# Patient Record
Sex: FEMALE | Race: WHITE | NOT HISPANIC OR LATINO | Employment: OTHER | URBAN - METROPOLITAN AREA
[De-identification: names, ages, dates, MRNs, and addresses within clinical notes are randomized per-mention and may not be internally consistent; named-entity substitution may affect disease eponyms.]

---

## 2017-01-18 ENCOUNTER — OFFICE VISIT (OUTPATIENT)
Dept: FAMILY MEDICINE | Facility: OTHER | Age: 32
End: 2017-01-18
Payer: COMMERCIAL

## 2017-01-18 VITALS
OXYGEN SATURATION: 98 % | BODY MASS INDEX: 25.34 KG/M2 | HEIGHT: 63 IN | HEART RATE: 84 BPM | TEMPERATURE: 97.8 F | DIASTOLIC BLOOD PRESSURE: 60 MMHG | WEIGHT: 143 LBS | SYSTOLIC BLOOD PRESSURE: 108 MMHG | RESPIRATION RATE: 12 BRPM

## 2017-01-18 DIAGNOSIS — J06.9 VIRAL UPPER RESPIRATORY TRACT INFECTION: Primary | ICD-10-CM

## 2017-01-18 DIAGNOSIS — R68.89 FLU-LIKE SYMPTOMS: ICD-10-CM

## 2017-01-18 LAB
DEPRECATED S PYO AG THROAT QL EIA: NORMAL
FLUAV+FLUBV AG SPEC QL: NEGATIVE
FLUAV+FLUBV AG SPEC QL: NORMAL
MICRO REPORT STATUS: NORMAL
SPECIMEN SOURCE: NORMAL
SPECIMEN SOURCE: NORMAL

## 2017-01-18 PROCEDURE — 87081 CULTURE SCREEN ONLY: CPT | Performed by: PHYSICIAN ASSISTANT

## 2017-01-18 PROCEDURE — 87804 INFLUENZA ASSAY W/OPTIC: CPT | Performed by: PHYSICIAN ASSISTANT

## 2017-01-18 PROCEDURE — 87880 STREP A ASSAY W/OPTIC: CPT | Performed by: PHYSICIAN ASSISTANT

## 2017-01-18 PROCEDURE — 99213 OFFICE O/P EST LOW 20 MIN: CPT | Performed by: PHYSICIAN ASSISTANT

## 2017-01-18 RX ORDER — BENZONATATE 100 MG/1
100 CAPSULE ORAL 3 TIMES DAILY PRN
Qty: 30 CAPSULE | Refills: 0 | Status: SHIPPED | OUTPATIENT
Start: 2017-01-18 | End: 2017-03-31

## 2017-01-18 ASSESSMENT — PAIN SCALES - GENERAL: PAINLEVEL: MODERATE PAIN (5)

## 2017-01-18 NOTE — PATIENT INSTRUCTIONS
Negative strep and influenza testing.  This is likely a viral illness.  Will prescribe Tessalon pearls to help with the cough.  Continue with Sudafed and Mucinex as needed.  I recommend you try Flonase to help with then nasal congestion/swelling.  Drink plenty of fluids.  Use a humidifier nightly.  Honey mixed with warm tea can help with cough and sore throat.   Follow up if symptoms are not improving.

## 2017-01-18 NOTE — PROGRESS NOTES
"  SUBJECTIVE:                                                    Yue Ruano is a 31 year old female who presents to clinic today for the following health issues:    HPI    Acute Illness   Acute illness concerns: flu?  Onset: 4 days     Fever: YES    Chills/Sweats: YES    Headache (location?): YES- frontal    Sinus Pressure:YES- post-nasal drainage    Conjunctivitis:  no    Ear Pain: YES: both    Rhinorrhea: no    Congestion: YES    Sore Throat: YES- might be due to coughing?     Cough: YES-productive of green sputum    Wheeze: no    Decreased Appetite: YES    Nausea: YES    Vomiting: no    Diarrhea:  no    Dysuria/Freq.: no    Fatigue/Achiness: YES    Sick/Strep Exposure: YES-  had flu/children sick     Therapies Tried and outcome: mucinex, tylenol cold, sudafed PE, radha seltzer plus    Symptoms since early Saturday morning. Sinus congestion, productive cough and ear pain.     Problem list and histories reviewed & adjusted, as indicated.  Additional history: as documented    Problem list, Medication list, Allergies, and Medical/Social/Surgical histories reviewed in EPIC and updated as appropriate.    ROS:  GENERAL: Denies fever, fatigue, weakness, weight gain, or weight loss.  HEENT: Eyes-Denies pain, redness, loss of vision, double or blurred vision.     Ears/Nose- +Congestion, sinus pressure, frontal headache, sore throat. Denies tinnitus, loss of hearing, epistaxis, decreased sense of smell. Denies loss of sense of taste, dry mouth.  CARDIOVASCULAR: Denies chest pain, shortness of breath, irregular heartbeats,  palpitations, or edema.  RESPIRATORY: +Productive cough. Denies hemoptysis and shortness of breath.     OBJECTIVE:                                                    /60 mmHg  Pulse 84  Temp(Src) 97.8  F (36.6  C) (Temporal)  Resp 12  Ht 5' 3.39\" (1.61 m)  Wt 143 lb (64.864 kg)  BMI 25.02 kg/m2  SpO2 98%  Body mass index is 25.02 kg/(m^2).  GENERAL: healthy, alert and no " distress  EYES: Eyes grossly normal to inspection, PERRL and conjunctivae and sclerae normal  HENT: ear canals and TM's normal. Nasal mucosa is erythematous. Pharynx is clear.   NECK: no adenopathy, no asymmetry, masses, or scars and thyroid normal to palpation  RESP: lungs clear to auscultation - no rales, rhonchi or wheezes  CV: regular rate and rhythm, normal S1 S2, no S3 or S4, no murmur, click or rub    Diagnostic Test Results:  Strep screen - Negative  Influenza A/B: Negative     ASSESSMENT/PLAN:                                                        ICD-10-CM    1. Viral upper respiratory tract infection J06.9 Strep, Rapid Screen    B97.89 Beta strep group A culture     benzonatate (TESSALON) 100 MG capsule   2. Flu-like symptoms R68.89 Influenza A/B antigen       Negative strep and influenza testing.  Discussed likely viral etiology.   Will prescribe Tessalon pearls to help with the cough.  Continue with Sudafed and Mucinex as needed.  I recommend she try Flonase to help with then nasal congestion/swelling.  Drink plenty of fluids.  Use a humidifier nightly.  Honey mixed with warm tea can help with cough and sore throat.   Follow up if symptoms are not improving.     Brijesh Adam PA-C  [facility]

## 2017-01-18 NOTE — MR AVS SNAPSHOT
After Visit Summary   1/18/2017    Yue Ruano    MRN: [id number]           Patient Information     Date Of Birth          1985        Visit Information        Provider Department      1/18/2017 1:30 PM Brijesh Adam PA-C [facility]        Today's Diagnoses     Viral upper respiratory tract infection    -  1     Flu-like symptoms           Care Instructions    Negative strep and influenza testing.  This is likely a viral illness.  Will prescribe Tessalon pearls to help with the cough.  Continue with Sudafed and Mucinex as needed.  I recommend you try Flonase to help with then nasal congestion/swelling.  Drink plenty of fluids.  Use a humidifier nightly.  Honey mixed with warm tea can help with cough and sore throat.   Follow up if symptoms are not improving.         Follow-ups after your visit        Who to contact     If you have questions or need follow up information about today's clinic visit or your schedule please contact [facility] directly at [phone].  Normal or non-critical lab and imaging results will be communicated to you by [employer], letter or phone within 4 business days after the clinic has received the results. If you do not hear from us within 7 days, please contact the clinic through [employer] or phone. If you have a critical or abnormal lab result, we will notify you by phone as soon as possible.  Submit refill requests through [employer] or call your pharmacy and they will forward the refill request to us. Please allow 3 business days for your refill to be completed.          Additional Information About Your Visit        [employer] Information     [employer] gives you secure access to your electronic health record. If you see a primary care provider, you can also send messages to your care team and make appointments. If you have questions, please call your primary care clinic.  If you do not have a primary care provider, please call  "[phone] and they will assist you.        Care EveryWhere ID     This is your Care EveryWhere ID. This could be used by other organizations to access your [facility] medical records  [id number]        Your Vitals Were     Pulse Temperature Respirations Height BMI (Body Mass Index) Pulse Oximetry    84 97.8  F (36.6  C) (Temporal) 12 5' 3.39\" (1.61 m) 25.02 kg/m2 98%       Blood Pressure from Last 3 Encounters:   01/18/17 108/60   09/16/16 114/70   04/01/16 98/60    Weight from Last 3 Encounters:   01/18/17 143 lb (64.864 kg)   09/16/16 142 lb (64.411 kg)   04/01/16 145 lb (65.772 kg)              We Performed the Following     Beta strep group A culture     Influenza A/B antigen     Strep, Rapid Screen          Today's Medication Changes          These changes are accurate as of: 1/18/17  2:07 PM.  If you have any questions, ask your nurse or doctor.               Start taking these medicines.        Dose/Directions    benzonatate 100 MG capsule   Commonly known as:  TESSALON   Used for:  Viral upper respiratory tract infection   Started by:  Brijesh Adam PA-C        Dose:  100 mg   Take 1 capsule (100 mg) by mouth 3 times daily as needed   Quantity:  30 capsule   Refills:  0            Where to get your medicines      These medications were sent to [facility] [address] [address]     Phone:  [phone]    - benzonatate 100 MG capsule             Primary Care Provider Office Phone # Fax #    Laurie Curtis [phone]       [facility]  [address] [facility] [address]        Thank you!     Thank you for choosing [facility]  for your care. Our goal is always to provide you with excellent care. Hearing back from our patients is one way we can continue to improve our services. Please take a few minutes to complete the written survey that you may receive in the mail after your visit with " us. Thank you!             Your Updated Medication List - Protect others around you: Learn how to safely use, store and throw away your medicines at www.disposemymeds.org.          This list is accurate as of: 1/18/17  2:07 PM.  Always use your most recent med list.                   Brand Name Dispense Instructions for use    benzonatate 100 MG capsule    TESSALON    30 capsule    Take 1 capsule (100 mg) by mouth 3 times daily as needed       loratadine 10 MG tablet    CLARITIN     Take 10 mg by mouth daily       norethindrone 0.35 MG per tablet    MICRONOR    84 tablet    Take 1 tablet (0.35 mg) by mouth daily       polyethylene glycol powder    MIRALAX    510 g    Take 17 g by mouth daily       PRENATAL MULTIvitamin  -ULTRA per tablet     100 tablet    Take 1 tablet by mouth daily       sertraline 25 MG tablet    ZOLOFT    30 tablet    Take 1 tablet (25 mg) by mouth daily

## 2017-01-18 NOTE — NURSING NOTE
"Chief Complaint   Patient presents with     Flu       Initial /60 mmHg  Pulse 84  Temp(Src) 97.8  F (36.6  C) (Temporal)  Resp 12  Ht 5' 3.39\" (1.61 m)  Wt 143 lb (64.864 kg)  BMI 25.02 kg/m2  SpO2 98% Estimated body mass index is 25.02 kg/(m^2) as calculated from the following:    Height as of this encounter: 5' 3.39\" (1.61 m).    Weight as of this encounter: 143 lb (64.864 kg).  BP completed using cuff size: regular    Charmaine Hugo CMA      "

## 2017-01-20 LAB
BACTERIA SPEC CULT: NORMAL
MICRO REPORT STATUS: NORMAL
SPECIMEN SOURCE: NORMAL

## 2017-03-15 ENCOUNTER — TELEPHONE (OUTPATIENT)
Dept: NURSING | Facility: CLINIC | Age: 32
End: 2017-03-15

## 2017-03-16 NOTE — TELEPHONE ENCOUNTER
"Call Type: Triage Call    Presenting Problem: \"I have very itchy hands, feet, and an itchy  head.\" Symptom began a few months ago, but the itchiness continues.  Pt. has followed her 's recommendations, to relieve the itch, but  pt. says that nothing is helping. The itchy skin only happens at  night, too.  Triage Note:  Guideline Title: Allergies ; Allergies  Recommended Disposition: Call Provider within 8 Hours  Original Inclination: Wanted to speak with a nurse  Override Disposition: See Physician within 4 Hours  Intended Action: Patient does not know  Physician Contacted: No  First occurrence of mild allergic reaction (rash; hives; itching; nasal  congestion; watery, red eyes) that occurs within minutes to several hours after  exposure to an allergen AND without any other signs or symptoms of anaphylaxis. ?  YES  Signs/symptoms of anaphylaxis ? NO  Severe breathing problems not related to nasal congestion ? NO  Breathing problems not related to nasal congestion ? NO  Diagnosed with asthma and having symptoms ? NO  Rash without any signs of anaphylaxis ? NO  Hives without any signs of anaphylaxis ? NO  Skin lesions without any signs of anaphylaxis ? NO  Physician Instructions:  Care Advice: Apply cool compresses for 15 to 20 minutes 4 to 6 times a day  to relieve discomfort.  Wear loose-fitting, non-restricting clothing.  Cool/tepid showers or baths may help relieve itching.  If cool water alone  does not relieve itching, try adding 1/2 to 1 cup baking soda or colloidal  oatmeal (Aveeno) to bath water.  Should not be alone for the next 24 hours in case symptoms worsen.  IMMEDIATE ACTION  CAUTIONS  SYMPTOM / CONDITION MANAGEMENT  Call  if develop signs and symptoms of anaphylaxis within minutes to  several hours of exposure: severe difficulty breathing  rapid, weak or irregular pulse  pruritus, urticaria, swelling of face, lips, tongue, or throat causing  tightness or difficulty swallowing  abdominal " cramping, nausea, vomiting or diarrhea.  If immediately available, consider taking an appropriate dose of a  nonprescription antihistamine (e.g., Benadryl) orally as directed by the  label or a pharmacist. These medications may cause drowsiness and should be  taken with caution by adults 75 years or older.

## 2017-03-31 ENCOUNTER — OFFICE VISIT (OUTPATIENT)
Dept: FAMILY MEDICINE | Facility: CLINIC | Age: 32
End: 2017-03-31
Payer: COMMERCIAL

## 2017-03-31 VITALS
BODY MASS INDEX: 24.67 KG/M2 | DIASTOLIC BLOOD PRESSURE: 65 MMHG | SYSTOLIC BLOOD PRESSURE: 130 MMHG | TEMPERATURE: 98.5 F | WEIGHT: 141 LBS | HEART RATE: 95 BPM

## 2017-03-31 DIAGNOSIS — J06.9 VIRAL URI WITH COUGH: Primary | ICD-10-CM

## 2017-03-31 PROCEDURE — 99213 OFFICE O/P EST LOW 20 MIN: CPT | Performed by: FAMILY MEDICINE

## 2017-03-31 RX ORDER — CODEINE PHOSPHATE AND GUAIFENESIN 10; 100 MG/5ML; MG/5ML
1-2 SOLUTION ORAL EVERY 4 HOURS PRN
Qty: 240 ML | Refills: 0 | Status: SHIPPED | OUTPATIENT
Start: 2017-03-31 | End: 2017-05-05

## 2017-03-31 NOTE — NURSING NOTE
"Chief Complaint   Patient presents with     Ear Problem     vomit, hedache, st, head congestion, cough, x 6 days       Initial /65 (BP Location: Right arm, Cuff Size: Adult Regular)  Pulse 95  Temp 98.5  F (36.9  C) (Oral)  Wt 141 lb (64 kg)  BMI 24.67 kg/m2 Estimated body mass index is 24.67 kg/(m^2) as calculated from the following:    Height as of 1/18/17: 5' 3.39\" (1.61 m).    Weight as of this encounter: 141 lb (64 kg).  Medication Reconciliation: complete   Gosia Dougherty M.A.      "

## 2017-03-31 NOTE — MR AVS SNAPSHOT
After Visit Summary   3/31/2017    Yue Ruano    MRN: [id number]           Patient Information     Date Of Birth          1985        Visit Information        Provider Department      3/31/2017 11:30 AM Jeremiah Nash MD [facility]        Today's Diagnoses     Viral URI with cough    -  1       Follow-ups after your visit        Who to contact     If you have questions or need follow up information about today's clinic visit or your schedule please contact [facility] directly at [phone].  Normal or non-critical lab and imaging results will be communicated to you by MyChart, letter or phone within 4 business days after the clinic has received the results. If you do not hear from us within 7 days, please contact the clinic through [employer] or phone. If you have a critical or abnormal lab result, we will notify you by phone as soon as possible.  Submit refill requests through [employer] or call your pharmacy and they will forward the refill request to us. Please allow 3 business days for your refill to be completed.          Additional Information About Your Visit        MyChart Information     [employer] gives you secure access to your electronic health record. If you see a primary care provider, you can also send messages to your care team and make appointments. If you have questions, please call your primary care clinic.  If you do not have a primary care provider, please call [phone] and they will assist you.        Care EveryWhere ID     This is your Care EveryWhere ID. This could be used by other organizations to access your [facility] medical records  [id number]        Your Vitals Were     Pulse Temperature BMI (Body Mass Index)             95 98.5  F (36.9  C) (Oral) 24.67 kg/m2          Blood Pressure from Last 3 Encounters:   03/31/17 130/65   01/18/17 108/60   09/16/16 114/70    Weight from Last 3 Encounters:   03/31/17 141 lb (64 kg)   01/18/17 143  lb (64.9 kg)   09/16/16 142 lb (64.4 kg)              Today, you had the following     No orders found for display         Today's Medication Changes          These changes are accurate as of: 3/31/17  3:43 PM.  If you have any questions, ask your nurse or doctor.               Start taking these medicines.        Dose/Directions    guaiFENesin-codeine 100-10 MG/5ML Soln solution   Commonly known as:  ROBITUSSIN AC   Used for:  Viral URI with cough   Started by:  Jeremiah Nash MD        Dose:  1-2 tsp.   Take 5-10 mLs by mouth every 4 hours as needed for cough   Quantity:  240 mL   Refills:  0            Where to get your medicines      Some of these will need a paper prescription and others can be bought over the counter.  Ask your nurse if you have questions.     Bring a paper prescription for each of these medications     guaiFENesin-codeine 100-10 MG/5ML Soln solution                Primary Care Provider Office Phone # Fax #    Laurie Na Curtis [phone]       [address] [address]        Thank you!     Thank you for choosing [facility]  for your care. Our goal is always to provide you with excellent care. Hearing back from our patients is one way we can continue to improve our services. Please take a few minutes to complete the written survey that you may receive in the mail after your visit with us. Thank you!             Your Updated Medication List - Protect others around you: Learn how to safely use, store and throw away your medicines at www.disposemymeds.org.          This list is accurate as of: 3/31/17  3:43 PM.  Always use your most recent med list.                   Brand Name Dispense Instructions for use    guaiFENesin-codeine 100-10 MG/5ML Soln solution    ROBITUSSIN AC    240 mL    Take 5-10 mLs by mouth every 4 hours as needed for cough

## 2017-03-31 NOTE — PROGRESS NOTES
"SUBJECTIVE:   Yue Ruano is a 31 year old female presenting with a chief complaint of a cough.  The patient first noted the onset of symptoms was 5 day(s) ago.  The patient (or parent) reports that she first had symptoms of fatigue . After that she started having symptoms of bilateral ear pain and a sore throat. These symptom(s) got worse the next day. After that she started having symptoms of a cough and some muscle aches. Other symptoms that followed include feeling warm but she did not check her temperature. She vomited once on Tuesday.      She (or parent) denies: shortness of breath.    The patient has the following predisposing factors for infection:None.    Patient Active Problem List   Diagnosis     CARDIOVASCULAR SCREENING; LDL GOAL LESS THAN 160     High-risk pregnancy     Previous  delivery, antepartum condition or complication     S/P  section     Adjustment disorder with mixed anxiety and depressed mood     No current outpatient prescriptions on file.     Social History   Substance Use Topics     Smoking status: Former Smoker     Years: 1.00     Types: Cigarettes     Smokeless tobacco: Never Used      Comment: Was a \"social\" smoker  NONE SINCE PREGNANT     Alcohol use No       OBJECTIVE  :/65 (BP Location: Right arm, Cuff Size: Adult Regular)  Pulse 95  Temp 98.5  F (36.9  C) (Oral)  Wt 141 lb (64 kg)  BMI 24.67 kg/m2  GENERAL APPEARANCE: healthy, alert and no distress  EYES: EOMI,  PERRL, conjunctiva clear  HENT: ear canals and TM's normal.  Nose and mouth without ulcers, erythema or lesions  NECK: supple, nontender, no lymphadenopathy  RESP: lungs clear to auscultation - no rales, rhonchi or wheezes  CV: regular rates and rhythm, normal S1 S2, no murmur noted  ABDOMEN:  soft, nontender, no HSM or masses and bowel sounds normal  NEURO: Normal strength and tone, sensory exam grossly normal,  normal speech and mentation  SKIN: no suspicious lesions or " mar    ASSESSMENT:  Viral upper respiratory infection    PLAN:  The patient was reassured that there is no evidence of a bacterial etiology., I recommended that the patient get lots of fluids and rest. and A prescription for Cherritussin AC was given    During the visit I did wear a mask the entire time I was in the exam room with the patient.    During the visit the patient did wear a mask while I was in the exam room with her  except when I was examining her nose and throat or doing the nasopharyngeal swab.

## 2017-05-04 NOTE — PROGRESS NOTES
"  SUBJECTIVE:                                                    Yue Ruano is a 31 year old female who presents to clinic today for the following health issues:      HPI    Concern - Skin bump      Onset: 2 months     Description:   Red bump, right upper arm     Intensity: moderate    Progression of Symptoms:  same    Accompanying Signs & Symptoms:  -bleeds daily  -isn't getting smaller  -size of an eraser        Previous history of similar problem:   None     Precipitating factors:   Worsened by: none    Alleviating factors:  Improved by: bandaids keep it from bleeding        Therapies Tried and outcome: clear ointment, popping it.   She states that area showed up a couple months ago she is concerned that it is bleeding a lot.    PHQ-9 SCORE 12/28/2015 9/16/2016 5/5/2017   Total Score 2 12 4     EJ-7 SCORE 12/28/2015 9/16/2016 5/5/2017   Total Score 1 9 11     Anxiety level is elevated. She feels this is due to worry over lesion on arm.     Problem list and histories reviewed & adjusted, as indicated.  Additional history: as documented      ROS:  Constitutional, HEENT, cardiovascular, pulmonary, gi and gu systems are negative, except as otherwise noted.    OBJECTIVE:                                                    /70 (BP Location: Left arm, Patient Position: Chair, Cuff Size: Adult Regular)  Pulse 62  Temp 98  F (36.7  C) (Oral)  Resp 16  Ht 5' 3.39\" (1.61 m)  Wt 139 lb (63 kg)  BMI 24.32 kg/m2  Body mass index is 24.32 kg/(m^2).  GENERAL: healthy, alert and no distress  NECK: no adenopathy, no asymmetry, masses, or scars and thyroid normal to palpation  SKIN: angiomata - right deltoid 1.5 cm diameter. Dry non oozing.    Diagnostic Test Results:  none      ASSESSMENT/PLAN:                                                      1. Cherry angioma    - DERMATOLOGY REFERRAL  Differential diagnosis could include pyogenic granuloma. She is concerned that if removed may leave scar. She would like " least invasive means to remove. Recommend seeing dermatology specialist as this may be treated with laser if applicable she will discuss further with specialty.       2. Adjustment disorder with mixed anxiety and depressed mood    Her anxiety level is elevated and she states that she feels this is mainly due to the lesion on her right arm. She states that she feels better now knowing that she can have removed. She will return to clinic if symptoms worsen or do not improve.    The patient indicates understanding of these issues and agrees with the plan.  Return to clinic prn    See Patient Instructions    KATIANA Whitt [facility]

## 2017-05-05 ENCOUNTER — OFFICE VISIT (OUTPATIENT)
Dept: FAMILY MEDICINE | Facility: OTHER | Age: 32
End: 2017-05-05
Payer: COMMERCIAL

## 2017-05-05 VITALS
TEMPERATURE: 98 F | RESPIRATION RATE: 16 BRPM | WEIGHT: 139 LBS | SYSTOLIC BLOOD PRESSURE: 110 MMHG | HEART RATE: 62 BPM | DIASTOLIC BLOOD PRESSURE: 70 MMHG | BODY MASS INDEX: 24.63 KG/M2 | HEIGHT: 63 IN

## 2017-05-05 DIAGNOSIS — F43.23 ADJUSTMENT DISORDER WITH MIXED ANXIETY AND DEPRESSED MOOD: ICD-10-CM

## 2017-05-05 DIAGNOSIS — D18.01 CHERRY ANGIOMA: Primary | ICD-10-CM

## 2017-05-05 PROCEDURE — 99214 OFFICE O/P EST MOD 30 MIN: CPT | Performed by: NURSE PRACTITIONER

## 2017-05-05 ASSESSMENT — ANXIETY QUESTIONNAIRES
1. FEELING NERVOUS, ANXIOUS, OR ON EDGE: NEARLY EVERY DAY
3. WORRYING TOO MUCH ABOUT DIFFERENT THINGS: MORE THAN HALF THE DAYS
7. FEELING AFRAID AS IF SOMETHING AWFUL MIGHT HAPPEN: NOT AT ALL
2. NOT BEING ABLE TO STOP OR CONTROL WORRYING: MORE THAN HALF THE DAYS
GAD7 TOTAL SCORE: 11
5. BEING SO RESTLESS THAT IT IS HARD TO SIT STILL: SEVERAL DAYS
IF YOU CHECKED OFF ANY PROBLEMS ON THIS QUESTIONNAIRE, HOW DIFFICULT HAVE THESE PROBLEMS MADE IT FOR YOU TO DO YOUR WORK, TAKE CARE OF THINGS AT HOME, OR GET ALONG WITH OTHER PEOPLE: SOMEWHAT DIFFICULT
6. BECOMING EASILY ANNOYED OR IRRITABLE: SEVERAL DAYS

## 2017-05-05 ASSESSMENT — PAIN SCALES - GENERAL: PAINLEVEL: NO PAIN (0)

## 2017-05-05 ASSESSMENT — PATIENT HEALTH QUESTIONNAIRE - PHQ9: 5. POOR APPETITE OR OVEREATING: MORE THAN HALF THE DAYS

## 2017-05-05 NOTE — MR AVS SNAPSHOT
After Visit Summary   5/5/2017    Yue Ruano    MRN: [id number]           Patient Information     Date Of Birth          1985        Visit Information        Provider Department      5/5/2017 9:20 AM Korin Chun APRN CNP [facility]        Today's Diagnoses     Hemangioma of skin    -  1      Care Instructions    Please follow up with dermatology clinic for hemangioma treatment as discussed.     Thank you  Korin Chun CNP          Follow-ups after your visit        Additional Services     DERMATOLOGY REFERRAL       [facility] [phone]   [email or web address]    Please be aware that coverage of these services is subject to the terms and limitations of your health insurance plan.  Call member services at your health plan with any benefit or coverage questions.      Please bring the following with you to your appointment:    (1) Any X-Rays, CTs or MRIs which have been performed.  Contact the facility where they were done to arrange for  prior to your scheduled appointment.    (2) List of current medications  (3) This referral request   (4) Any documents/labs given to you for this referral                  Who to contact     If you have questions or need follow up information about today's clinic visit or your schedule please contact [facility] directly at [phone].  Normal or non-critical lab and imaging results will be communicated to you by MyChart, letter or phone within 4 business days after the clinic has received the results. If you do not hear from us within 7 days, please contact the clinic through MyChart or phone. If you have a critical or abnormal lab result, we will notify you by phone as soon as possible.  Submit refill requests through [employer] or call your pharmacy and they will forward the refill  "request to us. Please allow 3 business days for your refill to be completed.          Additional Information About Your Visit        [employer] Information     [employer] gives you secure access to your electronic health record. If you see a primary care provider, you can also send messages to your care team and make appointments. If you have questions, please call your primary care clinic.  If you do not have a primary care provider, please call [phone] and they will assist you.        Care EveryWhere ID     This is your Care EveryWhere ID. This could be used by other organizations to access your [place] medical records  [id number]        Your Vitals Were     Pulse Temperature Respirations Height BMI (Body Mass Index)       62 98  F (36.7  C) (Oral) 16 5' 3.39\" (1.61 m) 24.32 kg/m2        Blood Pressure from Last 3 Encounters:   05/05/17 110/70   03/31/17 130/65   01/18/17 108/60    Weight from Last 3 Encounters:   05/05/17 139 lb (63 kg)   03/31/17 141 lb (64 kg)   01/18/17 143 lb (64.9 kg)              We Performed the Following     DERMATOLOGY REFERRAL        Primary Care Provider Office Phone # Fax #    Laurie Na Curtis [phone]       [address] [address]        Thank you!     Thank you for choosing [facility]  for your care. Our goal is always to provide you with excellent care. Hearing back from our patients is one way we can continue to improve our services. Please take a few minutes to complete the written survey that you may receive in the mail after your visit with us. Thank you!             Your Updated Medication List - Protect others around you: Learn how to safely use, store and throw away your medicines at www.disposemymeds.org.      Notice  As of 5/5/2017 10:01 AM    You have not been prescribed any medications.      "

## 2017-05-05 NOTE — NURSING NOTE
"Chief Complaint   Patient presents with     Lesion     shoulder     Panel Management     phq9/angelica       Initial /70 (BP Location: Left arm, Patient Position: Chair, Cuff Size: Adult Regular)  Pulse 62  Temp 98  F (36.7  C) (Oral)  Resp 16  Ht 5' 3.39\" (1.61 m)  Wt 139 lb (63 kg)  BMI 24.32 kg/m2 Estimated body mass index is 24.32 kg/(m^2) as calculated from the following:    Height as of this encounter: 5' 3.39\" (1.61 m).    Weight as of this encounter: 139 lb (63 kg).  Medication Reconciliation: complete    "

## 2017-05-05 NOTE — PATIENT INSTRUCTIONS
Please follow up with dermatology clinic for cherry angioma treatment as discussed.     Thank you  Korin Chun CNP

## 2017-05-06 ASSESSMENT — PATIENT HEALTH QUESTIONNAIRE - PHQ9: SUM OF ALL RESPONSES TO PHQ QUESTIONS 1-9: 4

## 2017-05-06 ASSESSMENT — ANXIETY QUESTIONNAIRES: GAD7 TOTAL SCORE: 11

## 2018-07-23 ENCOUNTER — HEALTH MAINTENANCE LETTER (OUTPATIENT)
Age: 33
End: 2018-07-23

## 2018-09-14 ENCOUNTER — VIRTUAL VISIT (OUTPATIENT)
Dept: FAMILY MEDICINE | Facility: CLINIC | Age: 33
End: 2018-09-14

## 2018-09-14 DIAGNOSIS — R05.9 COUGH: ICD-10-CM

## 2018-09-14 DIAGNOSIS — R09.81 CONGESTION OF PARANASAL SINUS: Primary | ICD-10-CM

## 2018-09-14 PROCEDURE — 99441 ZZC PHYSICIAN TELEPHONE EVALUATION 5-10 MIN: CPT | Performed by: NURSE PRACTITIONER

## 2018-09-14 RX ORDER — AMOXICILLIN 500 MG/1
1000 CAPSULE ORAL 2 TIMES DAILY
Qty: 40 CAPSULE | Refills: 0 | Status: SHIPPED | OUTPATIENT
Start: 2018-09-14 | End: 2018-09-24

## 2018-09-14 RX ORDER — FLUTICASONE PROPIONATE 50 MCG
1-2 SPRAY, SUSPENSION (ML) NASAL DAILY
Qty: 1 BOTTLE | Refills: 11 | Status: SHIPPED | OUTPATIENT
Start: 2018-09-14

## 2018-09-14 RX ORDER — BENZONATATE 200 MG/1
200 CAPSULE ORAL 3 TIMES DAILY PRN
Qty: 21 CAPSULE | Refills: 0 | Status: SHIPPED | OUTPATIENT
Start: 2018-09-14 | End: 2018-10-19

## 2018-09-14 NOTE — MR AVS SNAPSHOT
After Visit Summary   9/14/2018    Yue Ruano    MRN: [id number]           Patient Information     Date Of Birth          1985        Visit Information        Provider Department      9/14/2018 12:20 PM Madisyn Singleton APRN CNP [facility]        Today's Diagnoses     Congestion of paranasal sinus    -  1    Cough           Follow-ups after your visit        Who to contact     If you have questions or need follow up information about today's clinic visit or your schedule please contact [facility] directly at [phone].  Normal or non-critical lab and imaging results will be communicated to you by [employer], letter or phone within 4 business days after the clinic has received the results. If you do not hear from us within 7 days, please contact the clinic through [employer] or phone. If you have a critical or abnormal lab result, we will notify you by phone as soon as possible.  Submit refill requests through [employer] or call your pharmacy and they will forward the refill request to us. Please allow 3 business days for your refill to be completed.          Additional Information About Your Visit        MyChart Information     [employer] gives you secure access to your electronic health record. If you see a primary care provider, you can also send messages to your care team and make appointments. If you have questions, please call your primary care clinic.  If you do not have a primary care provider, please call [phone] and they will assist you.        Care EveryWhere ID     This is your Care EveryWhere ID. This could be used by other organizations to access your [place] medical records  [id number]         Blood Pressure from Last 3 Encounters:   05/05/17 110/70   03/31/17 130/65   01/18/17 108/60    Weight from Last 3 Encounters:   05/05/17 139 lb (63 kg)   03/31/17 141 lb (64 kg)   01/18/17 143 lb (64.9 kg)              Today, you had the following     No orders found  for display         Today's Medication Changes          These changes are accurate as of 9/14/18  4:50 PM.  If you have any questions, ask your nurse or doctor.               Start taking these medicines.        Dose/Directions    amoxicillin 500 MG capsule   Commonly known as:  AMOXIL   Used for:  Congestion of paranasal sinus   Started by:  Madisyn Singleton APRN CNP        Dose:  1000 mg   Take 2 capsules (1,000 mg) by mouth 2 times daily for 10 days   Quantity:  40 capsule   Refills:  0       benzonatate 200 MG capsule   Commonly known as:  TESSALON   Used for:  Cough   Started by:  Madisyn Singleton APRN CNP        Dose:  200 mg   Take 1 capsule (200 mg) by mouth 3 times daily as needed for cough   Quantity:  21 capsule   Refills:  0       fluticasone 50 MCG/ACT spray   Commonly known as:  FLONASE   Used for:  Congestion of paranasal sinus   Started by:  Madisyn Singleton APRN CNP        Dose:  1-2 spray   Spray 1-2 sprays into both nostrils daily   Quantity:  1 Bottle   Refills:  11            Where to get your medicines      These medications were sent to [facility] [address] [place], [place] [id number]     Phone:  [phone]     amoxicillin 500 MG capsule    benzonatate 200 MG capsule    fluticasone 50 MCG/ACT spray                Primary Care Provider Office Phone # Fax #    Laurie Curtis [phone]       [address] [place] [address]        Equal Access to Services     [facility]: Hadii anitra chun hadasho Soangely, waaxda luqadaha, qaybta kaalmada adeegyada, waxay juliane mccallum. So [facility] [phone].    ATENCIÓN: Si habla español, tiene a hillman disposición servicios gratuitos de asistencia lingüística. Manohar al [phone].    We comply with applicable federal civil rights laws and [place] laws. We do not discriminate on the basis of race, color, national origin, age, disability, sex, sexual orientation, or gender  identity.            Thank you!     Thank you for choosing [facility]  for your care. Our goal is always to provide you with excellent care. Hearing back from our patients is one way we can continue to improve our services. Please take a few minutes to complete the written survey that you may receive in the mail after your visit with us. Thank you!             Your Updated Medication List - Protect others around you: Learn how to safely use, store and throw away your medicines at www.disposemymeds.org.          This list is accurate as of 9/14/18  4:50 PM.  Always use your most recent med list.                   Brand Name Dispense Instructions for use Diagnosis    amoxicillin 500 MG capsule    AMOXIL    40 capsule    Take 2 capsules (1,000 mg) by mouth 2 times daily for 10 days    Congestion of paranasal sinus       benzonatate 200 MG capsule    TESSALON    21 capsule    Take 1 capsule (200 mg) by mouth 3 times daily as needed for cough    Cough       fluticasone 50 MCG/ACT spray    FLONASE    1 Bottle    Spray 1-2 sprays into both nostrils daily    Congestion of paranasal sinus

## 2018-09-14 NOTE — PROGRESS NOTES
"Yue Ruano is a 33 year old female who is being evaluated via a telephone visit.      The patient has been notified of following:     \"This telephone visit will be conducted via a call between you and your physician/provider. We have found that certain health care needs can be provided without the need for a physical exam.  This service lets us provide the care you need with a short phone conversation.  If a prescription is necessary we can send it directly to your pharmacy.  If lab work is needed we can place an order for that and you can then stop by our lab to have the test done at a later time.    We will bill your insurance company for this service.  Please check with your medical insurance if this type of visit is covered. You may be responsible for the cost of this type of visit if insurance coverage is denied.  The typical cost is $30 (10min), $59 (11-20min) and $85 (21-30min).  Most often these visits are shorter than 10 minutes.    If during the course of the call the physician/provider feels a telephone visit is not appropriate, you will not be charged for this service.\"       Consent has been obtained for this service by care team member: yes.   See the scanned image in the medical record.    Yue Ruano complains of  Sinus Problem      I have reviewed and updated the patient's Past Medical History, Social History, Family History and Medication List.    ALLERGIES  No known drug allergies    Sasha Koenig CMA (AAMA)   (MA signature)    Additional provider notes: 8 months allergic symptoms. OTC allergy treatments. Nasal congestion- using nasal spray- Zicam and generic nasal target brand- Afrin?  Now cold symptoms. Ears ringing. Mild cough.   Fever- maybe slight earlier- not now.   Sudafed yest was not helpful. Tylenol cold and flu- not helpful.   Sore throat- mild  Not productive cough. Breathing well, PO is normal.       Assessment/Plan:     Congestion of paranasal sinus  Cough       Stop other " nasal sprays. Home cares, If recurrent or continues to be chronic- discussed ENT referral.   Warning signs discussed.   I have reviewed the note as documented above.  This accurately captures the substance of my conversation with the patient,        Total time of call between patient and provider was 6 minutes

## 2018-10-18 NOTE — PROGRESS NOTES
SUBJECTIVE:   Yue Ruano is a 33 year old female who presents to clinic today for the following health issues:      HPI  Acute Illness   Acute illness concerns: sinus/cold  Onset: cough 1 week, sinus 6 months    Fever: no     Chills/Sweats: no     Headache (location?): YES    Sinus Pressure:YES    Conjunctivitis:  no    Ear Pain: no    Rhinorrhea: YES    Congestion: YES    Sore Throat: YES     Cough: YES-non-productive    Wheeze: no     Decreased Appetite: no     Nausea: no     Vomiting: no     Diarrhea:  no     Dysuria/Freq.: no     Fatigue/Achiness: .Yes     Sick/Strep Exposure: YES     Therapies Tried and outcome: Nasal Spray, Tylenol Cold    She is constantly congested and has had trouble breathing through her nose for the past 6 months. She was treated for a sinus infection with Augmentin 6 months ago but this did not help. She has been using Zicam nasal spray twice daily for the past 5-6 months routinely which seems to be the only thing that helps with her congestion. She denies any recent allergy symptoms such as itchy, watery eyes or a scratchy throat. When she blows her nose, the mucous is usually clear. She has tried an off brand antihistamine on and off without benefit. She tried Flonase nasal spray last month for 2 weeks and feels like this worsened her congestion. She denies any fevers or chills. She states her sense of smell has been decreased recently.       Problem list and histories reviewed & adjusted, as indicated.  Additional history: none    ROS:  GENERAL: Denies fever, fatigue, weakness, weight gain, or weight loss.  HEENT: Eyes-Denies pain, redness, loss of vision, double or blurred vision.     Ears/Nose- +Sinus congestion/pressure. Denies tinnitus, loss of hearing, epistaxis, decreased sense of smell. Denies loss of sense of taste, dry mouth, or sore throat.   CARDIOVASCULAR: Denies chest pain, shortness of breath, irregular heartbeats, palpitations, or edema.  RESPIRATORY: +Dry cough.  Denies hemoptysis and shortness of breath.  NEUROLOGIC: +Intermittent frontal headaches. Denies fainting, dizziness, memory loss, numbness, tingling, or seizures.    OBJECTIVE:     /60  Pulse 101  Temp 98.1  F (36.7  C) (Temporal)  Wt 143 lb (64.9 kg)  SpO2 96%  Breastfeeding? No  BMI 25.02 kg/m2  Body mass index is 25.02 kg/(m^2).  GENERAL: healthy, alert and no distress  EYES: Eyes grossly normal to inspection, PERRL and conjunctivae and sclerae normal  HENT: ear canals and TM's normal. Nasal mucosa is mildly erythematous and edematous with clear drainage. No sinus tenderness.   NECK: no adenopathy, no asymmetry, masses, or scars and thyroid normal to palpation  RESP: lungs clear to auscultation - no rales, rhonchi or wheezes  CV: regular rate and rhythm, normal S1 S2, no S3 or S4, no murmur, click or rub    ASSESSMENT/PLAN:       ICD-10-CM    1. Chronic congestion of paranasal sinus J32.9        I recommend she stop the Zicam as this can cause worsening congestion over time and decreased sense of smell.   I recommend a daily antihistamine like Claritin or Zyrtec for the next month as her symptoms are likely allergy related versus chronic sinusitis. No evidence of acute sinusitis so antibiotics are not warranted.   I also recommend trying a saline nasal spray along with Flonase right using the saline to see if it is more effective..  I recommend she try Sudafed for 3-5 days.  Can also try a Nettipot again.  If not improving over the next month, will refer to ENT.      Brijesh Adam PA-C  [facility]

## 2018-10-19 ENCOUNTER — OFFICE VISIT (OUTPATIENT)
Dept: FAMILY MEDICINE | Facility: OTHER | Age: 33
End: 2018-10-19
Payer: MEDICAID

## 2018-10-19 VITALS
DIASTOLIC BLOOD PRESSURE: 60 MMHG | WEIGHT: 143 LBS | OXYGEN SATURATION: 96 % | TEMPERATURE: 98.1 F | SYSTOLIC BLOOD PRESSURE: 100 MMHG | BODY MASS INDEX: 25.02 KG/M2 | HEART RATE: 101 BPM

## 2018-10-19 DIAGNOSIS — J32.9 CHRONIC CONGESTION OF PARANASAL SINUS: Primary | ICD-10-CM

## 2018-10-19 PROCEDURE — 99213 OFFICE O/P EST LOW 20 MIN: CPT | Performed by: PHYSICIAN ASSISTANT

## 2018-10-19 NOTE — MR AVS SNAPSHOT
After Visit Summary   10/19/2018    Yue Ruano    MRN: [id number]           Patient Information     Date Of Birth          1985        Visit Information        Provider Department      10/19/2018 2:00 PM Brijesh Adam PA-C [facility]        Today's Diagnoses     Chronic congestion of paranasal sinus    -  1      Care Instructions    Stop the Zicam.  I recommend a daily antihistamine like Claritin or Zyrtec for the next month.  I also recommend trying a saline nasal spray along with Flonase right after.  Try Sudafed for 3-5 days.  You can also try a Nettipot again.  If not improving over the next month, will send you to ENT.              Follow-ups after your visit        Who to contact     If you have questions or need follow up information about today's clinic visit or your schedule please contact [facility] directly at [phone].  Normal or non-critical lab and imaging results will be communicated to you by [employer], letter or phone within 4 business days after the clinic has received the results. If you do not hear from us within 7 days, please contact the clinic through [employer] or phone. If you have a critical or abnormal lab result, we will notify you by phone as soon as possible.  Submit refill requests through [employer] or call your pharmacy and they will forward the refill request to us. Please allow 3 business days for your refill to be completed.          Additional Information About Your Visit        MyChart Information     [employer] gives you secure access to your electronic health record. If you see a primary care provider, you can also send messages to your care team and make appointments. If you have questions, please call your primary care clinic.  If you do not have a primary care provider, please call [phone] and they will assist you.        Care EveryWhere ID     This is your Care EveryWhere ID. This could be used by other  organizations to access your [place] medical records  [id number]        Your Vitals Were     Pulse Temperature Pulse Oximetry Breastfeeding? BMI (Body Mass Index)       101 98.1  F (36.7  C) (Temporal) 96% No 25.02 kg/m2        Blood Pressure from Last 3 Encounters:   10/19/18 100/60   05/05/17 110/70   03/31/17 130/65    Weight from Last 3 Encounters:   10/19/18 143 lb (64.9 kg)   05/05/17 139 lb (63 kg)   03/31/17 141 lb (64 kg)              Today, you had the following     No orders found for display         Today's Medication Changes          These changes are accurate as of 10/19/18  2:32 PM.  If you have any questions, ask your nurse or doctor.               Stop taking these medicines if you haven't already. Please contact your care team if you have questions.     benzonatate 200 MG capsule   Commonly known as:  TESSALON   Stopped by:  Brijesh Adam PA-C                    Primary Care Provider Office Phone # Fax #    Laurie Na Curtis [phone]       [address] [place] [address]        Equal Access to Services     [facility]: Hadii anitra chun hadasho Soangely, waaxda luqadaha, qaybta kaalmada rebecca, mario alberto frias . So [facility] [phone].    ATENCIÓN: Si habla español, tiene a hillman disposición servicios gratuitos de asistencia lingüística. [facility] [phone].    We comply with applicable federal civil rights laws and [place] laws. We do not discriminate on the basis of race, color, national origin, age, disability, sex, sexual orientation, or gender identity.            Thank you!     Thank you for choosing [facility]  for your care. Our goal is always to provide you with excellent care. Hearing back from our patients is one way we can continue to improve our services. Please take a few minutes to complete the written survey that you may receive in the mail after your visit with us. Thank you!             Your Updated  Medication List - Protect others around you: Learn how to safely use, store and throw away your medicines at www.disposemymeds.org.          This list is accurate as of 10/19/18  2:32 PM.  Always use your most recent med list.                   Brand Name Dispense Instructions for use Diagnosis    fluticasone 50 MCG/ACT spray    FLONASE    1 Bottle    Spray 1-2 sprays into both nostrils daily    Congestion of paranasal sinus

## 2018-10-19 NOTE — PATIENT INSTRUCTIONS
Stop the Zicam.  I recommend a daily antihistamine like Claritin or Zyrtec for the next month.  I also recommend trying a saline nasal spray along with Flonase right after.  Try Sudafed for 3-5 days.  You can also try a Nettipot again.  If not improving over the next month, will send you to ENT.

## 2018-11-29 ENCOUNTER — TELEPHONE (OUTPATIENT)
Dept: FAMILY MEDICINE | Facility: OTHER | Age: 33
End: 2018-11-29

## 2018-11-29 NOTE — TELEPHONE ENCOUNTER
Summary:    Patient is due/failing the following:   PAP and PHYSICAL    Action needed:   Patient needs office visit for PAP/PE.    Type of outreach:    Phone, left message for patient to call back.     Questions for provider review:    None                                                                                                                                    Tammy Espinal       Chart routed to Care Team .        Panel Management Review      Patient has the following on her problem list: None      Composite cancer screening  Chart review shows that this patient is due/due soon for the following Pap Smear

## 2019-04-10 ENCOUNTER — TELEPHONE (OUTPATIENT)
Dept: FAMILY MEDICINE | Facility: OTHER | Age: 34
End: 2019-04-10

## 2019-04-10 NOTE — TELEPHONE ENCOUNTER
Summary:    Patient is due/failing the following:   PAP and PHYSICAL    Action needed:   Patient needs office visit for Physical and PAP .    Type of outreach:    Sent letter.    Questions for provider review:    None                                                                                                                                    Tammy Espinal       Chart routed to Care Team .          Panel Management Review      Patient has the following on her problem list:     Depression / Dysthymia review    Measure:  Needs PHQ-9 score of 4 or less during index window.  Administer PHQ-9 and if score is 5 or more, send encounter to provider for next steps.        PHQ-9 SCORE 12/28/2015 9/16/2016 5/5/2017   PHQ-9 Total Score 2 12 4       If PHQ-9 recheck is 5 or more, route to provider for next steps.    Patient is due for:  PHQ9      Composite cancer screening  Chart review shows that this patient is due/due soon for the following Pap Smear

## 2019-04-10 NOTE — LETTER
[address]   [facility] [address]  Phone: [phone]  April 10, 2019      Yue Ruano  [address]  [place] [address]      Dear Yue,    We care about your health and have reviewed your health plan including your medical conditions, medications, and lab results.  Based on this review, it is recommended that you follow up regarding the following health topic(s):  -Cervical Cancer Screening  -Wellness (Physical) Visit     We recommend you take the following action(s):  -schedule a PAP SMEAR EXAM which is due.  Please disregard this reminder if you have had this exam elsewhere within the last year.  It would be helpful for us to have a copy of your recent pap smear report to update your records.     Please call us at the [facility] - [phone] (or use [facility] in [place]) to address the above recommendations.     Thank you for trusting [facility] and we appreciate the opportunity to serve you.  We look forward to supporting your healthcare needs in the future.    Healthy Regards,    Your Health Care Team  [facility]

## 2019-08-22 NOTE — PROGRESS NOTES
"Subjective     Yue Ruano is a 34 year old female who presents to clinic today for the following health issues:    History of Present Illness        She eats 2-3 servings of fruits and vegetables daily.She consumes 0 sweetened beverage(s) daily.  She is taking medications regularly.     Concern - Cold Sores  Onset: Ongoing    Description:   Patient reports she can feel one coming on.     Intensity: mild    Progression of Symptoms:  worsening      Previous history of similar problem:   Yes    Precipitating factors:   Patient reports she is under more stress lately and this has caused more frequent break outs.       Therapies Tried and outcome: HL30 , Orajel, Abreeva,     Patient is also requesting a medication for flying.       Patient Active Problem List   Diagnosis     CARDIOVASCULAR SCREENING; LDL GOAL LESS THAN 160     High-risk pregnancy     Previous  delivery, antepartum condition or complication     S/P  section     Adjustment disorder with mixed anxiety and depressed mood     Cherry angioma     Recurrent cold sores     Past Surgical History:   Procedure Laterality Date      SECTION  2011     SECTION performed by XOCHITL ESTRADA at [facility]      SECTION N/A 2015    Procedure:  SECTION;  Surgeon: Yobani Cantrell MD;  Location: [facility]       Social History     Tobacco Use     Smoking status: Former Smoker     Years: 1.00     Types: Cigarettes     Smokeless tobacco: Never Used     Tobacco comment: Was a \"social\" smoker  NONE SINCE PREGNANT   Substance Use Topics     Alcohol use: No     Family History   Problem Relation Age of Onset     Asthma Mother      Diabetes Maternal Grandmother      Hypertension Maternal Grandmother      Asthma Maternal Grandfather      Alcohol/Drug Maternal Grandfather      Cerebrovascular Disease Maternal Grandfather         X2     Known Genetic Syndrome Son         hydrocephaly - believed to be non-genetic     " "Depression/Anxiety Other      Known Genetic Syndrome Son          Current Outpatient Medications   Medication Sig Dispense Refill     valACYclovir (VALTREX) 1000 mg tablet Take 2 tablets (2,000 mg) by mouth 2 times daily for 1 day 4 tablet 0     fluticasone (FLONASE) 50 MCG/ACT spray Spray 1-2 sprays into both nostrils daily (Patient not taking: Reported on 10/19/2018) 1 Bottle 11     Allergies   Allergen Reactions     No Known Drug Allergies      Recent Labs   Lab Test 11/12/15  0927   CR 0.64   GFRESTIMATED >90  Non  GFR Calc     GFRESTBLACK >90   GFR Calc        BP Readings from Last 3 Encounters:   08/23/19 102/68   10/19/18 100/60   05/05/17 110/70    Wt Readings from Last 3 Encounters:   08/23/19 64.1 kg (141 lb 4.8 oz)   10/19/18 64.9 kg (143 lb)   05/05/17 63 kg (139 lb)         Reviewed and updated as needed this visit by Provider         Review of Systems   ROS COMP: CONSTITUTIONAL: NEGATIVE for fever, chills, change in weight  INTEGUMENTARY/SKIN: POSITIVE for beginning cold sores to the right inferior lip laterally  ENT/MOUTH: NEGATIVE for ear, mouth and throat problems  RESP: NEGATIVE for significant cough or SOB  CV: NEGATIVE for chest pain, palpitations or peripheral edema  PSYCHIATRIC: NEGATIVE for changes in mood or affect      Objective    /68   Pulse 72   Temp 97.2  F (36.2  C) (Temporal)   Resp 16   Ht 1.61 m (5' 3.39\")   Wt 64.1 kg (141 lb 4.8 oz)   BMI 24.72 kg/m    Body mass index is 24.72 kg/m .  Physical Exam   GENERAL: healthy, alert and no distress  EYES: Eyes grossly normal to inspection, PERRL and conjunctivae and sclerae normal  HENT: normal cephalic/atraumatic, ear canals and TM's normal, nose and mouth without ulcers or lesions, oropharynx clear and oral mucous membranes moist  NECK: no adenopathy, no asymmetry, masses, or scars and trachea midline and normal to palpation  RESP: lungs clear to auscultation - no rales, rhonchi or wheezes  CV: " regular rate and rhythm, normal S1 S2, no S3 or S4, no murmur, click or rub, no peripheral edema and peripheral pulses strong  MS: no gross musculoskeletal defects noted, no edema  SKIN: possible early herpes lesion with stinging and burning sensation.  NEURO: Normal strength and tone, sensory exam grossly normal and mentation intact  PSYCH: anxious and fatigued    Diagnostic Test Results:  Labs reviewed in Epic  No results found for this or any previous visit (from the past 24 hour(s)).        Assessment & Plan     1. Screening for malignant neoplasm of cervix  Due for full female physical soon    2. Recurrent cold sores  - valACYclovir (VALTREX) 1000 mg tablet; Take 2 tablets (2,000 mg) by mouth 2 times daily for 1 day  Dispense: 4 tablet; Refill: 0     Return in about 2 weeks (around 9/6/2019) for recheck of current condition, if symptoms do not improve.    Chivo Reyes PA-C  [facility]    Answers for HPI/ROS submitted by the patient on 8/23/2019   Chronic problems general questions HPI Form  If you checked off any problems, how difficult have these problems made it for you to do your work, take care of things at home, or get along with other people?: Not difficult at all  PHQ9 TOTAL SCORE: 1  EJ 7 TOTAL SCORE: 13

## 2019-08-23 ENCOUNTER — OFFICE VISIT (OUTPATIENT)
Dept: FAMILY MEDICINE | Facility: OTHER | Age: 34
End: 2019-08-23
Payer: COMMERCIAL

## 2019-08-23 VITALS
HEART RATE: 72 BPM | HEIGHT: 63 IN | RESPIRATION RATE: 16 BRPM | BODY MASS INDEX: 25.04 KG/M2 | SYSTOLIC BLOOD PRESSURE: 102 MMHG | TEMPERATURE: 97.2 F | WEIGHT: 141.3 LBS | DIASTOLIC BLOOD PRESSURE: 68 MMHG

## 2019-08-23 DIAGNOSIS — B00.1 RECURRENT COLD SORES: ICD-10-CM

## 2019-08-23 DIAGNOSIS — Z12.4 SCREENING FOR MALIGNANT NEOPLASM OF CERVIX: Primary | ICD-10-CM

## 2019-08-23 PROCEDURE — 99213 OFFICE O/P EST LOW 20 MIN: CPT | Performed by: PHYSICIAN ASSISTANT

## 2019-08-23 RX ORDER — VALACYCLOVIR HYDROCHLORIDE 1 G/1
2000 TABLET, FILM COATED ORAL 2 TIMES DAILY
Qty: 4 TABLET | Refills: 0 | Status: SHIPPED | OUTPATIENT
Start: 2019-08-23 | End: 2019-09-03

## 2019-08-23 ASSESSMENT — ANXIETY QUESTIONNAIRES
GAD7 TOTAL SCORE: 13
GAD7 TOTAL SCORE: 13
6. BECOMING EASILY ANNOYED OR IRRITABLE: NOT AT ALL
1. FEELING NERVOUS, ANXIOUS, OR ON EDGE: NEARLY EVERY DAY
5. BEING SO RESTLESS THAT IT IS HARD TO SIT STILL: SEVERAL DAYS
7. FEELING AFRAID AS IF SOMETHING AWFUL MIGHT HAPPEN: NOT AT ALL
GAD7 TOTAL SCORE: 13
3. WORRYING TOO MUCH ABOUT DIFFERENT THINGS: NEARLY EVERY DAY
4. TROUBLE RELAXING: NEARLY EVERY DAY
2. NOT BEING ABLE TO STOP OR CONTROL WORRYING: NEARLY EVERY DAY
7. FEELING AFRAID AS IF SOMETHING AWFUL MIGHT HAPPEN: NOT AT ALL

## 2019-08-23 ASSESSMENT — PATIENT HEALTH QUESTIONNAIRE - PHQ9
10. IF YOU CHECKED OFF ANY PROBLEMS, HOW DIFFICULT HAVE THESE PROBLEMS MADE IT FOR YOU TO DO YOUR WORK, TAKE CARE OF THINGS AT HOME, OR GET ALONG WITH OTHER PEOPLE: NOT DIFFICULT AT ALL
SUM OF ALL RESPONSES TO PHQ QUESTIONS 1-9: 1
SUM OF ALL RESPONSES TO PHQ QUESTIONS 1-9: 1

## 2019-08-23 ASSESSMENT — MIFFLIN-ST. JEOR: SCORE: 1316.25

## 2019-08-23 ASSESSMENT — PAIN SCALES - GENERAL: PAINLEVEL: MILD PAIN (3)

## 2019-08-24 ASSESSMENT — ANXIETY QUESTIONNAIRES: GAD7 TOTAL SCORE: 13

## 2019-08-24 ASSESSMENT — PATIENT HEALTH QUESTIONNAIRE - PHQ9: SUM OF ALL RESPONSES TO PHQ QUESTIONS 1-9: 1

## 2019-09-02 ENCOUNTER — TELEPHONE (OUTPATIENT)
Dept: FAMILY MEDICINE | Facility: OTHER | Age: 34
End: 2019-09-02

## 2019-09-02 NOTE — TELEPHONE ENCOUNTER
Reason for call:  Medication   If this is a refill request, has the caller requested the refill from the pharmacy already? No  Will the patient be using a [facility]? Yes  Name of the pharmacy and phone number for the current request: [facility] [phone]    Name of the medication requested: valACYclovir (VALTREX) 1000 mg tablet    Other request: Patient would like follow up call once prescription has been renewed     Phone number to reach patient:  Cell number on file:    Telephone Information:   Mobile [phone]       Best Time:  Anytime    Can we leave a detailed message on this number?  YES

## 2019-09-03 DIAGNOSIS — B00.1 RECURRENT COLD SORES: ICD-10-CM

## 2019-09-03 RX ORDER — VALACYCLOVIR HYDROCHLORIDE 1 G/1
2000 TABLET, FILM COATED ORAL 2 TIMES DAILY
Qty: 4 TABLET | Refills: 0 | Status: SHIPPED | OUTPATIENT
Start: 2019-09-03

## 2019-09-03 NOTE — PROGRESS NOTES
Refilled once.  No further refills without office visit.  Electronically signed:    Chivo Reyes PA-C

## 2019-12-17 ENCOUNTER — TELEPHONE (OUTPATIENT)
Dept: FAMILY MEDICINE | Facility: OTHER | Age: 34
End: 2019-12-17

## 2019-12-17 NOTE — TELEPHONE ENCOUNTER
Summary:    Patient is due/failing the following:   PAP and PHYSICAL    Action needed:   Patient needs office visit for Physical and PAP.    Type of outreach:    Sent [employer] message.    Questions for provider review:    None                                                                                                                                    Tammy Munoz CMA       Chart routed to Care Team .        Panel Management Review      Patient has the following on her problem list:     Depression / Dysthymia review    Measure:  Needs PHQ-9 score of 4 or less during index window.  Administer PHQ-9 and if score is 5 or more, send encounter to provider for next steps.      PHQ-9 SCORE 9/16/2016 5/5/2017 8/23/2019   PHQ-9 Total Score MyChart - - 1 (Minimal depression)   PHQ-9 Total Score 12 4 1       If PHQ-9 recheck is 5 or more, route to provider for next steps.    Patient is due for:  PHQ9      Composite cancer screening  Chart review shows that this patient is due/due soon for the following Pap Smear

## 2020-03-01 ENCOUNTER — HEALTH MAINTENANCE LETTER (OUTPATIENT)
Age: 35
End: 2020-03-01

## 2020-08-12 NOTE — PROGRESS NOTES
"   SUBJECTIVE:   CC: Yue Ruano is an 35 year old woman who presents for preventive health visit.     Healthy Habits:     Getting at least 3 servings of Calcium per day:  Yes    Bi-annual eye exam:  NO    Dental care twice a year:  NO    Sleep apnea or symptoms of sleep apnea:  None    Diet:  Regular (no restrictions)    Frequency of exercise:  1 day/week    Duration of exercise:  15-30 minutes    Taking medications regularly:  Yes    Barriers to taking medications:  Not applicable    Medication side effects:  None    PHQ-2 Total Score: 0    Additional concerns today:  Yes    Forms for  licensing.     Today's PHQ-2 Score:   PHQ-2 ( 1999 [employer]) 8/17/2020   Q1: Little interest or pleasure in doing things 0   Q2: Feeling down, depressed or hopeless 0   PHQ-2 Score 0   Q1: Little interest or pleasure in doing things Not at all   Q2: Feeling down, depressed or hopeless Not at all   PHQ-2 Score 0       Abuse: Current or Past(Physical, Sexual or Emotional)- No  Do you feel safe in your environment? Yes        Social History     Tobacco Use     Smoking status: Former Smoker     Years: 1.00     Types: Cigarettes     Smokeless tobacco: Never Used     Tobacco comment: Was a \"social\" smoker  NONE SINCE PREGNANT   Substance Use Topics     Alcohol use: No     If you drink alcohol do you typically have >3 drinks per day or >7 drinks per week? No    Reviewed orders with patient.  Reviewed health maintenance and updated orders accordingly - Yes  Lab work is in process    Mammogram Screening: Patient over age 50, mutual decision to screen reflected in health maintenance.    Pertinent mammograms are reviewed under the imaging tab.  History of abnormal Pap smear: NO - age 30-65 PAP every 5 years with negative HPV co-testing recommended  PAP / HPV 5/20/2015 7/30/2010   PAP NIL NIL     Reviewed and updated as needed this visit by clinical staff  Tobacco  Allergies  Meds  Med Hx  Surg Hx  Fam Hx  Soc Hx        Reviewed " "and updated as needed this visit by Provider        Past Medical History:   Diagnosis Date     NO ACTIVE PROBLEMS      Recurrent cold sores 2019      Past Surgical History:   Procedure Laterality Date      SECTION  2011     SECTION performed by XOCHITL ESTRADA at  [facility]      SECTION N/A 2015    Procedure:  SECTION;  Surgeon: Yobani Cantrell MD;  Location: [facility]       Review of Systems   Constitutional: Negative for chills and fever.   HENT: Negative for congestion, ear pain, hearing loss and sore throat.    Eyes: Negative for pain and visual disturbance.   Respiratory: Negative for cough and shortness of breath.    Cardiovascular: Negative for chest pain, palpitations and peripheral edema.   Gastrointestinal: Negative for abdominal pain, constipation, diarrhea, heartburn, hematochezia and nausea.   Breasts:  Negative for tenderness, breast mass and discharge.   Genitourinary: Negative for dysuria, frequency, genital sores, hematuria, pelvic pain, urgency, vaginal bleeding and vaginal discharge.   Musculoskeletal: Positive for arthralgias and myalgias. Negative for joint swelling.   Skin: Negative for rash.   Neurological: Negative for dizziness, weakness, headaches and paresthesias.   Psychiatric/Behavioral: Negative for mood changes. The patient is not nervous/anxious.    All other systems reviewed and are negative.         OBJECTIVE:   /68 (BP Location: Left arm, Patient Position: Chair, Cuff Size: Adult Regular)   Pulse 100   Temp 98  F (36.7  C) (Temporal)   Resp 16   Ht 1.618 m (5' 3.7\")   Wt 67.1 kg (148 lb)   SpO2 98%   BMI 25.64 kg/m    Physical Exam  Constitutional:       Appearance: She is well-developed.   HENT:      Right Ear: External ear normal.      Left Ear: External ear normal.      Nose: Nose normal.   Eyes:      Conjunctiva/sclera: Conjunctivae normal.      Pupils: Pupils are equal, round, and reactive to light.   Neck:    "   Musculoskeletal: Normal range of motion and neck supple.   Cardiovascular:      Rate and Rhythm: Normal rate and regular rhythm.      Heart sounds: Normal heart sounds.   Pulmonary:      Effort: Pulmonary effort is normal.      Breath sounds: Normal breath sounds.   Chest:      Breasts:         Right: Normal.         Left: Normal.   Abdominal:      General: Bowel sounds are normal.      Palpations: Abdomen is soft.   Genitourinary:     Labia:         Right: No rash.         Left: No rash.       Vagina: Normal.      Cervix: Normal.   Musculoskeletal: Normal range of motion.   Lymphadenopathy:      Upper Body:      Right upper body: No supraclavicular or axillary adenopathy.      Left upper body: No supraclavicular or axillary adenopathy.   Skin:     General: Skin is warm and dry.   Neurological:      Mental Status: She is alert and oriented to person, place, and time.         Diagnostic Test Results:  none     ASSESSMENT/PLAN:   1. Routine general medical examination at a health care facility  - Updated HM  - Patient trying to get licensed to have a .   - Recommend urine drug and update health. Denies any substances use, no mental health concerns.   - She notes she quit smoking a year ago.   - Forms for licensing filled out and awaiting drug urine.     2. Screening for malignant neoplasm of cervix  - Labs pending.   - Pap imaged thin layer screen with HPV - recommended age 30 - 65 years (select HPV order below)  - HPV High Risk Types DNA Cervical    3. Adjustment disorder with mixed anxiety and depressed mood  - As noted above, has not had any issues with this currently. This was mostly with her son when he was 5 and having some behavioral concerns.     4. Screening for lipid disorders    - Lipid panel reflex to direct LDL Fasting; Future    5. Screening for metabolic disorder    - Comprehensive metabolic panel (BMP + Alb, Alk Phos, ALT, AST, Total. Bili, TP); Future    6. Recurrent cold sores      7.  "Encounter for drug screening    - Drug Abuse Screen Panel 13, Urine (Pain Care Package)  - Drug  Screen Comprehensive, Urine w/o Reported Meds (Pain Care Package)    8. Back strain, initial encounter  - Patient picked up her son to put him in his own bed after he fell asleep in her bed.   - Denies radiation of pain, localized to lower back.   - No numbness and tingling, no bowel or bladder issues.   - XR Lumbar Spine 2/3 Views; Future    COUNSELING:  Reviewed preventive health counseling, as reflected in patient instructions       Regular exercise       Healthy diet/nutrition    Estimated body mass index is 25.64 kg/m  as calculated from the following:    Height as of this encounter: 1.618 m (5' 3.7\").    Weight as of this encounter: 67.1 kg (148 lb).         reports that she has quit smoking. Her smoking use included cigarettes. She quit after 1.00 year of use. She has never used smokeless tobacco.      Counseling Resources:  ATP IV Guidelines  Pooled Cohorts Equation Calculator  Breast Cancer Risk Calculator  FRAX Risk Assessment  ICSI Preventive Guidelines  Dietary Guidelines for Americans, 2010  USDA's MyPlate  ASA Prophylaxis  Lung CA Screening    Erika Colunga, [facility]NA [facility]  "

## 2020-08-12 NOTE — PATIENT INSTRUCTIONS
- Xray today  - Continue with tylenol and ibuprofen for pain  - Recommend follow up depending on Xray results  - Start back stretches and no heavy lifting above 5lbs.   - Follow up if worsening symptoms.       KATIANA Scott CNP  Questions or concerns please feel free to send me a [employer] message or call me  Phone : [phone]      Preventive Health Recommendations  Female Ages 26 - 39  Yearly exam:   See your health care provider every year in order to    Review health changes.     Discuss preventive care.      Review your medicines if you your doctor has prescribed any.    Until age 30: Get a Pap test every three years (more often if you have had an abnormal result).    After age 30: Talk to your doctor about whether you should have a Pap test every 3 years or have a Pap test with HPV screening every 5 years.   You do not need a Pap test if your uterus was removed (hysterectomy) and you have not had cancer.  You should be tested each year for STDs (sexually transmitted diseases), if you're at risk.   Talk to your provider about how often to have your cholesterol checked.  If you are at risk for diabetes, you should have a diabetes test (fasting glucose).  Shots: Get a flu shot each year. Get a tetanus shot every 10 years.   Nutrition:     Eat at least 5 servings of fruits and vegetables each day.    Eat whole-grain bread, whole-wheat pasta and brown rice instead of white grains and rice.    Get adequate Calcium and Vitamin D.     Lifestyle    Exercise at least 150 minutes a week (30 minutes a day, 5 days of the week). This will help you control your weight and prevent disease.    Limit alcohol to one drink per day.    No smoking.     Wear sunscreen to prevent skin cancer.    See your dentist every six months for an exam and cleaning.    Patient Education     Back Care Tips    Caring for your back  These are things you can do to prevent a recurrence of acute back pain and to reduce symptoms from chronic  back pain:    Stay at a healthy weight. If you are overweight, losing weight will help most types of back pain.    Exercise is an important part of recovery from most types of back pain. The muscles behind and in front of the spine support the back. This means strengthening both the back muscles and the abdominal muscles will provide better support for your spine.     Swimming and brisk walking are good overall exercises to improve your fitness level.    Practice safe lifting methods (see below).    Practice good posture when sitting, standing, and walking. Don't sit for a long time. This puts more stress on the lower back than standing or walking.    Wear quality shoes with good arch support. Foot and ankle alignment can affect back symptoms. Don't wear high heels.    Therapeutic massage can help relax the back muscles without stretching them.    During the first 24 to 72 hours after an acute injury or flare-up of chronic back pain, put an ice pack on the painful area for 20 minutes and then remove it for 20 minutes. Do thisover a period of 60 to 90 minutes, or several times a day. As a safety precaution, don't use a heating pad at bedtime. Sleeping on a heating pad can lead to skin burns or tissue damage.    You can alternate using ice and heat.  Medicines  Talk with your healthcare provider before using medicines, especially if you have other health problems or are taking other medicines.    You may use over-the-counter medicines, such as acetaminophen, ibuprofen, or naprosyn to control pain, unless your healthcare provider prescribed other pain medicine. Talk with your healthcare provider before taking any medicines if you have a chronic condition such ass diabetes, liver or kidney disease, stomach ulcers, or digestive bleeding, or are taking blood thinners.    Be careful if you are given prescription pain medicines, opioids, or medicine for muscle spasm. They can cause drowsiness, and affect your coordination,  reflexes, and judgment. Don't drive or operate heavy machinery while taking these types of medicines. Take prescription pain medicine only as prescribed by your healthcare provider.  Lumbar stretch  This simple stretch will help relax muscle spasm and keep your back more limber. If exercise makes your back pain worse, don t do it.    Lie on your back with your knees bent and both feet on the ground.    Slowly raise your left knee to your chest as you flatten your lower back against the floor. Hold for 5 seconds.    Relax and repeat the exercise with your right knee.    Do 10 of these exercises for each leg.  Safe lifting method    Don t bend over at the waist to lift an object off the floor.  Instead, bend your knees and hips in a squat.     Keep your back and head upright    Hold the object close to your body, directly in front of you.    Straighten your legs to lift the object.     Lower the object to the floor in the reverse fashion.    If you must slide something across the floor, push it.    Posture tips  Sitting  Sit in chairs with straight backs or low-back support. Keep your knees lower than your hips, with your feet flat on the floor.  When driving, sit up straight. Adjust the seat forward so you are not leaning toward the steering wheel.  A small pillow or rolled towel behind your lower back may help if you are driving long distances.   Standing  When standing for long periods, shift most of your weight to one leg at a time. Switch legs every few minutes.   Sleeping  The best way to sleep is on your side with your knees bent. Put a low pillow under your head to support your neck in a neutral spine position. Don't use thick pillows that bend your neck to one side. Put a pillow between your legs to further relax your lower back. If you sleep on your back, put pillows under your knees to support your legs in a slightly flexed position. Use a firm mattress. If your mattress sags, replace it, or use a 1/2-inch  plywood board under the mattress to add support.  Follow-up care  Follow up with your healthcare provider, or as advised.  If X-rays, a CT scan or an MRI scan were taken, they may be reviewed by a radiologist. You will be told of any new findings that may affect your care.  Call 911  Call 911 if any of the following occur:    Trouble breathing    Confusion    Very drowsy    Fainting or loss of consciousness    Rapid or very slow heart rate    Loss of  bowel or bladder control  When to seek medical advice  Call your healthcare provider right away if any of the following occur:    Pain becomes worse or spreads to your arms or legs    Weakness or numbness in one or both arms or legs    Numbness in the groin area  Date Last Reviewed: 6/1/2016 2000-2019 [employer]. [address] [place] [address]. All rights reserved. This information is not intended as a substitute for professional medical care. Always follow your healthcare professional's instructions.           Patient Education     Back Exercises: Lower Back Stretch    To start, sit in a chair with your feet flat on the floor. Shift your weight slightly forward. Relax, and keep your ears, shoulders, and hips aligned while you do the following:    Sit with your feet well apart.    Bend forward and touch the floor with the backs of your hands. Relax and let your body drop.    Hold for 20 seconds. Return to starting position.    Repeat 2 times.   Date Last Reviewed: 11/1/2017 2000-2019 [employer]. [address] [place] [address]. All rights reserved. This information is not intended as a substitute for professional medical care. Always follow your healthcare professional's instructions.           Patient Education     Back Exercises: Side Stretch    To start, sit in a chair with your feet flat on the floor. Shift your weight slightly forward to avoid rounding your back. Relax. Keep your ears, shoulders, and hips  aligned:    Stretch your right arm overhead.    Slowly bend to the left. Don t twist your torso. Stay within your pain limits.    Hold for 20 seconds. Return to starting position.    Repeat 2 to 5 times. Then, switch to the other side.  Date Last Reviewed: 3/1/2018    6852-0785 [employer]. [address] [place] [address], [place] [address]. All rights reserved. This information is not intended as a substitute for professional medical care. Always follow your healthcare professional's instructions.

## 2020-08-17 ENCOUNTER — ANCILLARY PROCEDURE (OUTPATIENT)
Dept: GENERAL RADIOLOGY | Facility: OTHER | Age: 35
End: 2020-08-17
Attending: NURSE PRACTITIONER
Payer: COMMERCIAL

## 2020-08-17 ENCOUNTER — OFFICE VISIT (OUTPATIENT)
Dept: FAMILY MEDICINE | Facility: OTHER | Age: 35
End: 2020-08-17
Payer: COMMERCIAL

## 2020-08-17 VITALS
HEIGHT: 64 IN | OXYGEN SATURATION: 98 % | SYSTOLIC BLOOD PRESSURE: 104 MMHG | TEMPERATURE: 98 F | RESPIRATION RATE: 16 BRPM | DIASTOLIC BLOOD PRESSURE: 68 MMHG | HEART RATE: 100 BPM | WEIGHT: 148 LBS | BODY MASS INDEX: 25.27 KG/M2

## 2020-08-17 DIAGNOSIS — Z02.83 ENCOUNTER FOR DRUG SCREENING: ICD-10-CM

## 2020-08-17 DIAGNOSIS — Z00.00 ROUTINE GENERAL MEDICAL EXAMINATION AT A HEALTH CARE FACILITY: Primary | ICD-10-CM

## 2020-08-17 DIAGNOSIS — S39.012A BACK STRAIN, INITIAL ENCOUNTER: ICD-10-CM

## 2020-08-17 DIAGNOSIS — Z13.220 SCREENING FOR LIPID DISORDERS: ICD-10-CM

## 2020-08-17 DIAGNOSIS — F43.23 ADJUSTMENT DISORDER WITH MIXED ANXIETY AND DEPRESSED MOOD: ICD-10-CM

## 2020-08-17 DIAGNOSIS — Z13.228 SCREENING FOR METABOLIC DISORDER: ICD-10-CM

## 2020-08-17 DIAGNOSIS — B00.1 RECURRENT COLD SORES: ICD-10-CM

## 2020-08-17 DIAGNOSIS — Z12.4 SCREENING FOR MALIGNANT NEOPLASM OF CERVIX: ICD-10-CM

## 2020-08-17 LAB
AMPHETAMINES UR QL: NOT DETECTED NG/ML
BARBITURATES UR QL SCN: NOT DETECTED NG/ML
BENZODIAZ UR QL SCN: NOT DETECTED NG/ML
BUPRENORPHINE UR QL: NOT DETECTED NG/ML
CANNABINOIDS UR QL: ABNORMAL NG/ML
COCAINE UR QL SCN: NOT DETECTED NG/ML
D-METHAMPHET UR QL: NOT DETECTED NG/ML
METHADONE UR QL SCN: NOT DETECTED NG/ML
OPIATES UR QL SCN: NOT DETECTED NG/ML
OXYCODONE UR QL SCN: NOT DETECTED NG/ML
PCP UR QL SCN: NOT DETECTED NG/ML
PROPOXYPH UR QL: NOT DETECTED NG/ML
TRICYCLICS UR QL SCN: NOT DETECTED NG/ML

## 2020-08-17 PROCEDURE — 99395 PREV VISIT EST AGE 18-39: CPT | Performed by: NURSE PRACTITIONER

## 2020-08-17 PROCEDURE — 87624 HPV HI-RISK TYP POOLED RSLT: CPT | Performed by: NURSE PRACTITIONER

## 2020-08-17 PROCEDURE — G0145 SCR C/V CYTO,THINLAYER,RESCR: HCPCS | Performed by: NURSE PRACTITIONER

## 2020-08-17 PROCEDURE — 99000 SPECIMEN HANDLING OFFICE-LAB: CPT | Performed by: NURSE PRACTITIONER

## 2020-08-17 PROCEDURE — G0476 HPV COMBO ASSAY CA SCREEN: HCPCS | Performed by: NURSE PRACTITIONER

## 2020-08-17 PROCEDURE — 72100 X-RAY EXAM L-S SPINE 2/3 VWS: CPT

## 2020-08-17 PROCEDURE — 80307 DRUG TEST PRSMV CHEM ANLYZR: CPT | Mod: 90 | Performed by: NURSE PRACTITIONER

## 2020-08-17 PROCEDURE — 80306 DRUG TEST PRSMV INSTRMNT: CPT | Performed by: NURSE PRACTITIONER

## 2020-08-17 ASSESSMENT — ENCOUNTER SYMPTOMS
MYALGIAS: 1
HEMATURIA: 0
JOINT SWELLING: 0
FREQUENCY: 0
HEMATOCHEZIA: 0
EYE PAIN: 0
FEVER: 0
SORE THROAT: 0
DIARRHEA: 0
NAUSEA: 0
DYSURIA: 0
DIZZINESS: 0
WEAKNESS: 0
PARESTHESIAS: 0
HEARTBURN: 0
COUGH: 0
HEADACHES: 0
NERVOUS/ANXIOUS: 0
SHORTNESS OF BREATH: 0
CONSTIPATION: 0
BREAST MASS: 0
ABDOMINAL PAIN: 0
PALPITATIONS: 0
ARTHRALGIAS: 1
CHILLS: 0

## 2020-08-17 ASSESSMENT — MIFFLIN-ST. JEOR: SCORE: 1346.57

## 2020-08-17 ASSESSMENT — PAIN SCALES - GENERAL: PAINLEVEL: NO PAIN (0)

## 2020-08-18 ENCOUNTER — TELEPHONE (OUTPATIENT)
Dept: FAMILY MEDICINE | Facility: OTHER | Age: 35
End: 2020-08-18

## 2020-08-18 NOTE — TELEPHONE ENCOUNTER
Left message asking patient to return call.  Please inform patient of RESULTS from Provider below.       Please call patient her drug urine shows cannabis. She has denied drug use, is it possible she is on any supplements or CBD oil? I will be ordering a compreshensive panel that will be more specific. I trust that she is being honest.     KV

## 2020-08-18 NOTE — TELEPHONE ENCOUNTER
Patient called back today, reviewed the notes. Patient stated that she has been in a lot of pain with her back so she decided to try CBD oils to help reduce the pain. Patient stated that she needs some relief. Patient also set up lab work for tomorrow at 200pm. Thank you

## 2020-08-19 ENCOUNTER — MYC MEDICAL ADVICE (OUTPATIENT)
Dept: FAMILY MEDICINE | Facility: OTHER | Age: 35
End: 2020-08-19

## 2020-08-19 DIAGNOSIS — Z13.228 SCREENING FOR METABOLIC DISORDER: ICD-10-CM

## 2020-08-19 DIAGNOSIS — Z13.220 SCREENING FOR LIPID DISORDERS: ICD-10-CM

## 2020-08-19 LAB
ALBUMIN SERPL-MCNC: 4 G/DL (ref 3.4–5)
ALP SERPL-CCNC: 108 U/L (ref 40–150)
ALT SERPL W P-5'-P-CCNC: 25 U/L (ref 0–50)
ANION GAP SERPL CALCULATED.3IONS-SCNC: 6 MMOL/L (ref 3–14)
AST SERPL W P-5'-P-CCNC: 17 U/L (ref 0–45)
BILIRUB SERPL-MCNC: 0.2 MG/DL (ref 0.2–1.3)
BUN SERPL-MCNC: 10 MG/DL (ref 7–30)
CALCIUM SERPL-MCNC: 8.8 MG/DL (ref 8.5–10.1)
CHLORIDE SERPL-SCNC: 108 MMOL/L (ref 94–109)
CHOLEST SERPL-MCNC: 113 MG/DL
CO2 SERPL-SCNC: 25 MMOL/L (ref 20–32)
COPATH REPORT: NORMAL
CREAT SERPL-MCNC: 0.58 MG/DL (ref 0.52–1.04)
GFR SERPL CREATININE-BSD FRML MDRD: >90 ML/MIN/{1.73_M2}
GLUCOSE SERPL-MCNC: 105 MG/DL (ref 70–99)
HDLC SERPL-MCNC: 47 MG/DL
LDLC SERPL CALC-MCNC: 46 MG/DL
NONHDLC SERPL-MCNC: 66 MG/DL
PAP: NORMAL
POTASSIUM SERPL-SCNC: 3.7 MMOL/L (ref 3.4–5.3)
PROT SERPL-MCNC: 7.7 G/DL (ref 6.8–8.8)
SODIUM SERPL-SCNC: 139 MMOL/L (ref 133–144)
TRIGL SERPL-MCNC: 99 MG/DL

## 2020-08-19 PROCEDURE — 80061 LIPID PANEL: CPT | Performed by: NURSE PRACTITIONER

## 2020-08-19 PROCEDURE — 80053 COMPREHEN METABOLIC PANEL: CPT | Performed by: NURSE PRACTITIONER

## 2020-08-19 PROCEDURE — 36415 COLL VENOUS BLD VENIPUNCTURE: CPT | Performed by: NURSE PRACTITIONER

## 2020-08-19 NOTE — TELEPHONE ENCOUNTER
Responded via [employer].       KATIANA Scott CNP  Questions or concerns please feel free to send me a [employer] message or call me  Phone : [phone]

## 2020-08-20 LAB
FINAL DIAGNOSIS: ABNORMAL
HPV HR 12 DNA CVX QL NAA+PROBE: POSITIVE
HPV16 DNA SPEC QL NAA+PROBE: NEGATIVE
HPV18 DNA SPEC QL NAA+PROBE: NEGATIVE
SPECIMEN DESCRIPTION: ABNORMAL
SPECIMEN SOURCE CVX/VAG CYTO: ABNORMAL

## 2020-08-23 LAB — COMPREHEN DRUG ANALYSIS UR: NORMAL

## 2020-08-24 ENCOUNTER — PATIENT OUTREACH (OUTPATIENT)
Dept: FAMILY MEDICINE | Facility: OTHER | Age: 35
End: 2020-08-24

## 2020-08-25 NOTE — TELEPHONE ENCOUNTER
2010 NIL pap.  2015 NIL pap.  8/17/20 NIL pap, + HR HPV (not 16 or 18). Plan cotest in 1 year due bef 8/17/21.

## 2020-08-26 ENCOUNTER — MYC MEDICAL ADVICE (OUTPATIENT)
Dept: FAMILY MEDICINE | Facility: OTHER | Age: 35
End: 2020-08-26

## 2020-08-28 ENCOUNTER — MYC MEDICAL ADVICE (OUTPATIENT)
Dept: FAMILY MEDICINE | Facility: OTHER | Age: 35
End: 2020-08-28

## 2020-09-02 NOTE — TELEPHONE ENCOUNTER
In MA basket in back office. Please mail to patient.       KATIANA Scott CNP  Questions or concerns please feel free to send me a [employer] message or call me  Phone : [phone]

## 2020-09-22 ENCOUNTER — MYC MEDICAL ADVICE (OUTPATIENT)
Dept: FAMILY MEDICINE | Facility: OTHER | Age: 35
End: 2020-09-22

## 2020-11-22 ENCOUNTER — MYC MEDICAL ADVICE (OUTPATIENT)
Dept: FAMILY MEDICINE | Facility: OTHER | Age: 35
End: 2020-11-22

## 2020-12-14 ENCOUNTER — HEALTH MAINTENANCE LETTER (OUTPATIENT)
Age: 35
End: 2020-12-14

## 2021-01-29 ENCOUNTER — NURSE TRIAGE (OUTPATIENT)
Dept: NURSING | Facility: CLINIC | Age: 36
End: 2021-01-29

## 2021-02-17 ENCOUNTER — MYC MEDICAL ADVICE (OUTPATIENT)
Dept: FAMILY MEDICINE | Facility: OTHER | Age: 36
End: 2021-02-17

## 2021-02-18 NOTE — TELEPHONE ENCOUNTER
Recommend visit.       KATIANA Scott CNP  Questions or concerns please feel free to send me a [employer] message or call me  Phone : [phone]

## 2021-02-18 NOTE — TELEPHONE ENCOUNTER
I haven't seen patient since 2017, would advise appt to address, or can check with KV who last saw her.  Laurie Curtis MD

## 2021-07-17 ENCOUNTER — MYC MEDICAL ADVICE (OUTPATIENT)
Dept: FAMILY MEDICINE | Facility: OTHER | Age: 36
End: 2021-07-17

## 2021-08-06 ENCOUNTER — PATIENT OUTREACH (OUTPATIENT)
Dept: FAMILY MEDICINE | Facility: OTHER | Age: 36
End: 2021-08-06

## 2021-08-06 DIAGNOSIS — R87.810 CERVICAL HIGH RISK HPV (HUMAN PAPILLOMAVIRUS) TEST POSITIVE: ICD-10-CM

## 2021-10-02 ENCOUNTER — HEALTH MAINTENANCE LETTER (OUTPATIENT)
Age: 36
End: 2021-10-02

## 2022-01-14 ENCOUNTER — OFFICE VISIT (OUTPATIENT)
Dept: FAMILY MEDICINE | Facility: CLINIC | Age: 37
End: 2022-01-14
Payer: COMMERCIAL

## 2022-01-14 VITALS
DIASTOLIC BLOOD PRESSURE: 62 MMHG | RESPIRATION RATE: 18 BRPM | OXYGEN SATURATION: 96 % | HEART RATE: 99 BPM | WEIGHT: 155 LBS | BODY MASS INDEX: 26.46 KG/M2 | SYSTOLIC BLOOD PRESSURE: 114 MMHG | HEIGHT: 64 IN | TEMPERATURE: 100.7 F

## 2022-01-14 DIAGNOSIS — R11.0 NAUSEA: ICD-10-CM

## 2022-01-14 DIAGNOSIS — J20.9 ACUTE BRONCHITIS WITH SYMPTOMS > 10 DAYS: ICD-10-CM

## 2022-01-14 DIAGNOSIS — Z20.822 SUSPECTED COVID-19 VIRUS INFECTION: Primary | ICD-10-CM

## 2022-01-14 LAB
DEPRECATED S PYO AG THROAT QL EIA: NEGATIVE
FLUAV AG SPEC QL IA: NEGATIVE
FLUBV AG SPEC QL IA: NEGATIVE
GROUP A STREP BY PCR: NOT DETECTED

## 2022-01-14 PROCEDURE — 87804 INFLUENZA ASSAY W/OPTIC: CPT | Performed by: FAMILY MEDICINE

## 2022-01-14 PROCEDURE — 99214 OFFICE O/P EST MOD 30 MIN: CPT | Performed by: FAMILY MEDICINE

## 2022-01-14 PROCEDURE — U0003 INFECTIOUS AGENT DETECTION BY NUCLEIC ACID (DNA OR RNA); SEVERE ACUTE RESPIRATORY SYNDROME CORONAVIRUS 2 (SARS-COV-2) (CORONAVIRUS DISEASE [COVID-19]), AMPLIFIED PROBE TECHNIQUE, MAKING USE OF HIGH THROUGHPUT TECHNOLOGIES AS DESCRIBED BY CMS-2020-01-R: HCPCS | Performed by: FAMILY MEDICINE

## 2022-01-14 PROCEDURE — 87651 STREP A DNA AMP PROBE: CPT | Performed by: FAMILY MEDICINE

## 2022-01-14 PROCEDURE — U0005 INFEC AGEN DETEC AMPLI PROBE: HCPCS | Performed by: FAMILY MEDICINE

## 2022-01-14 RX ORDER — AZITHROMYCIN 250 MG/1
TABLET, FILM COATED ORAL
Qty: 6 TABLET | Refills: 0 | Status: SHIPPED | OUTPATIENT
Start: 2022-01-14 | End: 2022-01-19

## 2022-01-14 RX ORDER — BENZONATATE 100 MG/1
100 CAPSULE ORAL 3 TIMES DAILY PRN
Qty: 21 CAPSULE | Refills: 0 | Status: SHIPPED | OUTPATIENT
Start: 2022-01-14

## 2022-01-14 RX ORDER — ONDANSETRON 4 MG/1
4 TABLET, FILM COATED ORAL EVERY 8 HOURS PRN
Qty: 10 TABLET | Refills: 0 | Status: SHIPPED | OUTPATIENT
Start: 2022-01-14

## 2022-01-14 ASSESSMENT — ANXIETY QUESTIONNAIRES
7. FEELING AFRAID AS IF SOMETHING AWFUL MIGHT HAPPEN: NOT AT ALL
4. TROUBLE RELAXING: SEVERAL DAYS
GAD7 TOTAL SCORE: 5
1. FEELING NERVOUS, ANXIOUS, OR ON EDGE: SEVERAL DAYS
GAD7 TOTAL SCORE: 5
6. BECOMING EASILY ANNOYED OR IRRITABLE: SEVERAL DAYS
5. BEING SO RESTLESS THAT IT IS HARD TO SIT STILL: NOT AT ALL
7. FEELING AFRAID AS IF SOMETHING AWFUL MIGHT HAPPEN: NOT AT ALL
3. WORRYING TOO MUCH ABOUT DIFFERENT THINGS: SEVERAL DAYS
GAD7 TOTAL SCORE: 5
2. NOT BEING ABLE TO STOP OR CONTROL WORRYING: SEVERAL DAYS

## 2022-01-14 ASSESSMENT — ENCOUNTER SYMPTOMS
FEVER: 0
HEMOPTYSIS: 0
WEIGHT LOSS: 0
WHEEZING: 0
HEADACHES: 1
HEARTBURN: 0
MYALGIAS: 1
COUGH: 1
SHORTNESS OF BREATH: 1
RHINORRHEA: 1
SWEATS: 1
CHILLS: 1
SORE THROAT: 1

## 2022-01-14 ASSESSMENT — PATIENT HEALTH QUESTIONNAIRE - PHQ9
SUM OF ALL RESPONSES TO PHQ QUESTIONS 1-9: 12
SUM OF ALL RESPONSES TO PHQ QUESTIONS 1-9: 12
10. IF YOU CHECKED OFF ANY PROBLEMS, HOW DIFFICULT HAVE THESE PROBLEMS MADE IT FOR YOU TO DO YOUR WORK, TAKE CARE OF THINGS AT HOME, OR GET ALONG WITH OTHER PEOPLE: SOMEWHAT DIFFICULT

## 2022-01-14 ASSESSMENT — MIFFLIN-ST. JEOR: SCORE: 1375.21

## 2022-01-14 NOTE — PROGRESS NOTES
"Assessment & Plan     Suspected COVID-19 virus infection  36-year-old female with 1 week history of viral URI-like symptoms, seem to be improving, however got markedly worse last night, even with some vomiting secondary to coughing.  She did do COVID testing previously at home, today she did appear fatigued, however examination was otherwise reassuring with good lung sounds.  Influenza and strep were negative.  We are awaiting COVID testing.  Advised patient to rest, good hydration, follow-up immediately if any worsening of symptoms.  Anticipate improvement day by day.  In the off chance that this could be bacterial such as an acute bronchitis, and the fact that we are heading into the weekend, I am also ordering azithromycin for her to start.  Again follow-up any worsening of symptoms.  - Streptococcus A Rapid Screen w/Reflex to PCR  - Influenza A & B Antigen  - Symptomatic; Yes; 1/13/2022 COVID-19 Virus (Coronavirus) by PCR Nasopharyngeal  - Group A Streptococcus PCR Throat Swab  - benzonatate (TESSALON) 100 MG capsule; Take 1 capsule (100 mg) by mouth 3 times daily as needed for cough    Nausea  Occasional nausea, likely secondary to coughing, will use Zofran.  - ondansetron (ZOFRAN) 4 MG tablet; Take 1 tablet (4 mg) by mouth every 8 hours as needed for nausea       BMI:   Estimated body mass index is 26.76 kg/m  as calculated from the following:    Height as of this encounter: 1.621 m (5' 3.82\").    Weight as of this encounter: 70.3 kg (155 lb).       Depression Screening Follow Up    PHQ 1/14/2022   PHQ-9 Total Score 12   Q9: Thoughts of better off dead/self-harm past 2 weeks Not at all     Last PHQ-9 1/14/2022   1.  Little interest or pleasure in doing things 3   2.  Feeling down, depressed, or hopeless 2   3.  Trouble falling or staying asleep, or sleeping too much 3   4.  Feeling tired or having little energy 3   5.  Poor appetite or overeating 1   6.  Feeling bad about yourself 0   7.  Trouble concentrating " 0   8.  Moving slowly or restless 0   Q9: Thoughts of better off dead/self-harm past 2 weeks 0   PHQ-9 Total Score 12   Difficulty at work, home, or with people -       Follow Up Actions Taken  No follow-ups on file.    Tomas Bobo MD  [facility] JENNIFER Ruano is a 36 year old female who presents to clinic today for the following health issues:    Cough  This is a recurrent problem. The current episode started 1 to 4 weeks ago. The problem occurs constantly. The problem has been waxing and waning. The cough is non-productive. Associated symptoms include chest pain, chills, sweats, ear pain, headaches, rhinorrhea, sore throat, myalgias and shortness of breath. Pertinent negatives include no weight loss, no ear congestion and no wheezing.          Acute Illness  Acute illness concerns: Cough, vomiting  Onset/Duration: last night  Symptoms:  Fever: no - not this morning when taking temperature  Chills/Sweats: YES  Headache (location?): YES  Sinus Pressure: no  Conjunctivitis:  no  Ear Pain: YES: both  Rhinorrhea: YES  Congestion: YES  Sore Throat: YES  Cough: YES-non-productive, with shortness of breath  Wheeze: no  Decreased Appetite: YES  Nausea: YES  Vomiting: YES  Diarrhea: no  Dysuria/Freq.: no  Dysuria or Hematuria: no  Fatigue/Achiness: YES  Sick/Strep Exposure: no  Therapies tried and outcome: Ibuprofen    Answers for HPI/ROS submitted by the patient on 1/14/2022  If you checked off any problems, how difficult have these problems made it for you to do your work, take care of things at home, or get along with other people?: Somewhat difficult  PHQ9 TOTAL SCORE: 12  EJ 7 TOTAL SCORE: 5      Review of Systems   Constitutional: Positive for chills. Negative for fever and weight loss.   HENT: Positive for ear pain, postnasal drip, rhinorrhea and sore throat.    Respiratory: Positive for cough and shortness of breath. Negative for wheezing.    Cardiovascular: Positive for  "chest pain.   Gastrointestinal: Negative for heartburn.   Musculoskeletal: Positive for myalgias.   Skin: Negative for rash.   Neurological: Positive for headaches.      Constitutional, HEENT, cardiovascular, pulmonary, gi and gu systems are negative, except as otherwise noted.      Objective    /62   Pulse 99   Temp (!) 100.7  F (38.2  C) (Temporal)   Resp 18   Ht 1.621 m (5' 3.82\")   Wt 70.3 kg (155 lb)   SpO2 96%   BMI 26.76 kg/m    Body mass index is 26.76 kg/m .  Physical Exam   GENERAL: alert, no distress and fatigued  EYES: Eyes grossly normal to inspection, PERRL and conjunctivae and sclerae normal  HENT: ear canals and TM's normal, nose and mouth without ulcers or lesions  NECK: no adenopathy, no asymmetry, masses, or scars and thyroid normal to palpation  RESP: lungs clear to auscultation - no rales, rhonchi or wheezes  CV: regular rate and rhythm, normal S1 S2, no S3 or S4, no murmur, click or rub, no peripheral edema and peripheral pulses strong  ABDOMEN: soft, nontender, no hepatosplenomegaly, no masses and bowel sounds normal  MS: no gross musculoskeletal defects noted, no edema  NEURO: Normal strength and tone, mentation intact and speech normal  PSYCH: mentation appears normal, affect normal/bright    Results for orders placed or performed in visit on 01/14/22 (from the past 24 hour(s))   Streptococcus A Rapid Screen w/Reflex to PCR    Specimen: Throat; Swab   Result Value Ref Range    Group A Strep antigen Negative Negative   Influenza A & B Antigen    Specimen: Nasopharyngeal; Swab   Result Value Ref Range    Influenza A antigen Negative Negative    Influenza B antigen Negative Negative    Narrative    Test results must be correlated with clinical data. If necessary, results should be confirmed by a molecular assay or viral culture.         "

## 2022-01-15 LAB — SARS-COV-2 RNA RESP QL NAA+PROBE: NEGATIVE

## 2022-01-15 ASSESSMENT — ANXIETY QUESTIONNAIRES: GAD7 TOTAL SCORE: 5

## 2022-05-26 ENCOUNTER — NURSE TRIAGE (OUTPATIENT)
Dept: NURSING | Facility: CLINIC | Age: 37
End: 2022-05-26
Payer: COMMERCIAL

## 2022-05-26 NOTE — TELEPHONE ENCOUNTER
Questions about covid symptoms/isolation/mask wearing.    Answered.    Jenifer CUELLAR RN [facility]

## 2022-07-20 ENCOUNTER — E-VISIT (OUTPATIENT)
Dept: FAMILY MEDICINE | Facility: OTHER | Age: 37
End: 2022-07-20
Payer: COMMERCIAL

## 2022-07-20 DIAGNOSIS — B00.1 COLD SORE: Primary | ICD-10-CM

## 2022-07-20 PROCEDURE — 99421 OL DIG E/M SVC 5-10 MIN: CPT | Performed by: FAMILY MEDICINE

## 2023-01-14 ENCOUNTER — HEALTH MAINTENANCE LETTER (OUTPATIENT)
Age: 38
End: 2023-01-14

## 2023-11-30 NOTE — TELEPHONE ENCOUNTER
Chief Complaint   Patient presents with    Pain     1. Have you been to the ER, urgent care clinic since your last visit? Hospitalized since your last visit? No    2. Have you seen or consulted any other health care providers outside of the [address] [place] [address] Avenue since your last visit? Include any pap smears or colon screening. No     Pt been complaining about pain on left side for about 3 months; Says its worst when laying. MyChart reply sent.   Raven Diana MA     activities slowly as tolerated. Call if symptoms persist. Take tylenol or diclofenac as needed as ordered. Please call and schedule for ultrasound as discussed today. Drink at least 4 bottles of water, about 64 oz daily. Go to the ED immediately if pain worsens, fever, chest pain, shortness of breath and if any other abnormalities, call at the office and make a follow up appointment with Dr. Latosha Voss. Follow-up and Dispositions    Return for OV:Follow with Dr. Latosha Voss as planned, OR SOONER IF NEEDED. I have reviewed with the patient details of the assessment and plan and all questions were answered. Relevent patient education was performed. The most recent lab findings were reviewed with the patient. An After Visit Summary was printed and given to the patient.     Signed By: ROBERT Frank NP     November 30, 2023         -----------------------------------------------------------------------------------------------------------------------------------------

## 2024-02-17 ENCOUNTER — HEALTH MAINTENANCE LETTER (OUTPATIENT)
Age: 39
End: 2024-02-17

## 2025-03-08 ENCOUNTER — HEALTH MAINTENANCE LETTER (OUTPATIENT)
Age: 40
End: 2025-03-08

## 2025-07-13 ENCOUNTER — HEALTH MAINTENANCE LETTER (OUTPATIENT)
Age: 40
End: 2025-07-13